# Patient Record
Sex: FEMALE | Race: WHITE | NOT HISPANIC OR LATINO | Employment: STUDENT | URBAN - METROPOLITAN AREA
[De-identification: names, ages, dates, MRNs, and addresses within clinical notes are randomized per-mention and may not be internally consistent; named-entity substitution may affect disease eponyms.]

---

## 2023-07-12 ENCOUNTER — APPOINTMENT (OUTPATIENT)
Dept: LAB | Facility: CLINIC | Age: 23
End: 2023-07-12

## 2023-07-12 DIAGNOSIS — Z02.1 PRE-EMPLOYMENT HEALTH SCREENING EXAMINATION: ICD-10-CM

## 2023-07-12 LAB
MEV IGG SER QL IA: NORMAL
MUV IGG SER QL IA: NORMAL
RUBV IGG SERPL IA-ACNC: 26.8 IU/ML
VZV IGG SER QL IA: NORMAL

## 2023-07-12 PROCEDURE — 36415 COLL VENOUS BLD VENIPUNCTURE: CPT

## 2023-07-12 PROCEDURE — 86765 RUBEOLA ANTIBODY: CPT

## 2023-07-12 PROCEDURE — 86787 VARICELLA-ZOSTER ANTIBODY: CPT

## 2023-07-12 PROCEDURE — 86735 MUMPS ANTIBODY: CPT

## 2023-07-12 PROCEDURE — 86762 RUBELLA ANTIBODY: CPT

## 2023-07-12 PROCEDURE — 86480 TB TEST CELL IMMUN MEASURE: CPT

## 2023-07-13 LAB
GAMMA INTERFERON BACKGROUND BLD IA-ACNC: 0.04 IU/ML
M TB IFN-G BLD-IMP: NEGATIVE
M TB IFN-G CD4+ BCKGRND COR BLD-ACNC: 0.02 IU/ML
M TB IFN-G CD4+ BCKGRND COR BLD-ACNC: 0.02 IU/ML
MITOGEN IGNF BCKGRD COR BLD-ACNC: >10 IU/ML

## 2024-08-26 ENCOUNTER — OFFICE VISIT (OUTPATIENT)
Age: 24
End: 2024-08-26

## 2024-08-26 VITALS — TEMPERATURE: 98.4 F | WEIGHT: 293 LBS

## 2024-08-26 DIAGNOSIS — B07.9 VERRUCA VULGARIS: Primary | ICD-10-CM

## 2024-08-26 DIAGNOSIS — D22.39 FIBROUS PAPULE OF NOSE: ICD-10-CM

## 2024-08-26 NOTE — PATIENT INSTRUCTIONS
"VERRUCA VULGARIS (\"Common Warts\")    Physical Exam:  Anatomic Location Affected:  right and left hands, left elbow  Morphological Description:  verruca vulgaris  Pertinent Positives:  Pertinent Negatives:    Additional History of Present Condition:  previous cryo treatment    Assessment and Plan:  Based on a thorough discussion of this condition and the management approach to it (including a comprehensive discussion of the known risks, side effects and potential benefits of treatment), the patient (family) agrees to implement the following specific plan:  Apply over the counter Compound W wart treatment; paint on; file warts  Liquid nitrogen was applied for 10-12 seconds to the skin lesion and the expected blistering or scabbing reaction explained. Do not pick at the area. Patient reminded to expect hypopigmented scars from the procedure. Return if lesion fails to fully resolve.      Verruca Vulgaris  A verruca is a common growth of the skin caused by infection by human papilloma virus (HPV). There are many strains of the virus that cause different types of warts on the body. The virus infects the most superficial layers of the skin, causing increased production of skin cells and thickening. Warts can be spread through direct contact with infected skin and may spread to other parts of the body if scratched or picked.     A verruca is more commonly called a \"wart.\" Warts are particularly common in school-aged children but can arise at any age. Patients who have a history of eczema are especially prone due to impaired skin barrier. Those taking immunosuppressive drugs or with HIV infections may experience prolonged symptoms despite treatment.     Warts generally have a rough surface with a tiny black dot sometimes observed in the middle of each scaly spot. They can range in size from a small bump to large scaly growths.  Common warts are often found on the backs of fingers or toes, around the nails, and on the knees. " Plantar warts can grow inwardly on the soles of the feet causing pain.    There are many possible ways to treat warts and sometimes several different treatments are needed to get the warts to go away completely. There is no single perfect treatment for warts, and successful treatment can take many months.     In-office treatments usually require multiple visits, and include:  Cryotherapy. a cold spray with liquid nitrogen will destroy the infected cells but may lead to discomfort and blistering. It may also leave a permanent white stan or scar.      Electrosurgery (curettage and cautery) can be used for large resistant warts which involves shaving the growth down and burning the base. It is performed under local anesthesia and may leave a permanent scar    Candida (“yeast”) antigen injections. These are extracts of the common yeast (Candida) that cannot cause an infection. The medication is injected into/under the wart. It is thought to stimulate the immune system to recognize the wart virus and attack it. Multiple injections are often needed about one month apart.    There are also several at-home wart treatments:    Soak the warts in warm water for 5 minutes every night followed by gentle filing with a nail file or pumice stone.    Topical salicylic acid or similar compounds work by removing the dead surface skin cells  Apply the medicine directly to the wart, wait for it to dry completely, then cover with duct tape overnight   Repeat until the wart is gone, which can take 2-4 months  Do not use on the face or groin area   If the wart paint makes the skin sore, stop treatment until the discomfort has settled, then recommence as above.  Take care to keep the chemical off normal skin.    Podophyllin is a cytotoxic agent used in some products and must not be used in pregnancy or women considering pregnancy.    Some prescription medications include   Topical retinoids (adapalene, tretinoin, tazarotene), 5-fluorouracil  "(Efudex) or imiquimod (Aldara) creams are sometimes used to treat flat warts or warts on the face and other sensitive anatomical areas. They are usually applied directly to the warts once a day for 2-4 months and can be irritating.  These treatments should only be used as directed by your health care provider.  Systemic treatment with oral cimetidine (Tagamet) may help boost the immune system against the wart virus in patients, some of the time.  Initiation of cimetidine therapy should ONLY be done under the supervision of your health care provider, who can discuss possible side effects and drug-to-drug interactions of this specific treatment.        FIBROUS PAPULE (\"ANGIOFIBROMA\")    Physical Exam:  Anatomic Location Affected:  left side tip of nose  Morphological Description:  yellowish papule  Pertinent Positives:  Pertinent Negatives:    Additional History of Present Condition:  patient notes that has papule on nose; won't go away    Assessment and Plan:  Based on a thorough discussion of this condition and the management approach to it (including a comprehensive discussion of the known risks, side effects and potential benefits of treatment), the patient (family) agrees to implement the following specific plan:  Reassured benign    A fibrous papule is a firm bump that most often occurs on the nose. It is very common and has a characteristic appearance under the microscope.    A fibrous papule develops during late adolescence or early adult life on the nose, or less often, elsewhere on the face. It is a dome shaped shiny bump measuring 2-6 mm in diameter, sometimes with a central hair. Although it looks similar to a skin-colored mole (dermal nevus), it is firmer in texture. It is harmless but will not go away on its own.   It is important to distinguish fibrous papule from basal cell carcinoma, which may also present as a firm shiny bump. Basal cell carcinoma most often arises later in life. It grows slowly and " tends to bleed and ulcerate.     A fibrous papule is diagnosed either clinically or by skin biopsy. There are distinctive features  on pathology (proliferation of fibroblasts, fibrotic stroma, and dilated blood vessels).      Fibrous papule is considered a nevus, and develops spontaneously. The precise reason is unknown.  Fibrous papules do not require any treatment. If desired it may be removed by a minor surgical procedure (excision biopsy, shave biopsy or electrosurgery).

## 2024-08-26 NOTE — PROGRESS NOTES
"[facility] Dermatology Clinic Note     Patient Name: Yelena Pierce  Encounter Date: 8/26/24     Have you been cared for by a [facility] Dermatologist in the last 3 years and, if so, which description applies to you?    NO.   I am considered a \"new\" patient and must complete all patient intake questions. I am FEMALE/of child-bearing potential.    REVIEW OF SYSTEMS:  Have you recently had or currently have any of the following? Recent fever or chills? No  Any non-healing wound? No  Are you pregnant or planning to become pregnant? No  Are you currently or planning to be nursing or breast feeding? No   PAST MEDICAL HISTORY:  Have you personally ever had or currently have any of the following?  If \"YES,\" then please provide more detail. Skin cancer (such as Melanoma, Basal Cell Carcinoma, Squamous Cell Carcinoma?  No  Tuberculosis, HIV/AIDS, Hepatitis B or C: No  Radiation Treatment No   HISTORY OF IMMUNOSUPPRESSION:   Do you have a history of any of the following:  Systemic Immunosuppression such as Diabetes, Biologic or Immunotherapy, Chemotherapy, Organ Transplantation, Bone Marrow Transplantation or Prednsione?  No    Answering \"YES\" requires the addition of the dotphrase \"IMMUNOSUPPRESSED\" as the first diagnosis of the patient's visit.   FAMILY HISTORY:  Any \"first degree relatives\" (parent, brother, sister, or child) with the following?    Skin Cancer, Pancreatic or Other Cancer? No   PATIENT EXPERIENCE:    Do you want the Dermatologist to perform a COMPLETE skin exam today including a clinical examination under the \"bra and underwear\" areas?  NO  If necessary, do we have your permission to call and leave a detailed message on your Preferred Phone number that includes your specific medical information?  Yes      No Known Allergies No current outpatient medications on file.          Whom besides the patient is providing clinical information about today's encounter?   NO ADDITIONAL HISTORIAN (patient alone provided " "history)    Physical Exam and Assessment/Plan by Diagnosis:      VERRUCA VULGARIS (\"Common Warts\")    Physical Exam:  Anatomic Location Affected:  right and left hands, left elbow.  Several periungual warts both proximal and distal nail foldsl  Morphological Description:  verruca vulgaris  Pertinent Positives:  Pertinent Negatives:    Additional History of Present Condition:  previous cryo treatment    Assessment and Plan:  Based on a thorough discussion of this condition and the management approach to it (including a comprehensive discussion of the known risks, side effects and potential benefits of treatment), the patient (family) agrees to implement the following specific plan:  Apply over the counter Compound W wart treatment; paint on; file warts  Liquid nitrogen was applied for 10-12 seconds to the skin lesion and the expected blistering or scabbing reaction explained. Do not pick at the area. Patient reminded to expect hypopigmented scars from the procedure. Return if lesion fails to fully resolve.  Follow up in 1 month  I discussed that these are difficult areas to treat.   Option of immuno/ marcela therapy tentatively discussed.      Verruca Vulgaris  A verruca is a common growth of the skin caused by infection by human papilloma virus (HPV). There are many strains of the virus that cause different types of warts on the body. The virus infects the most superficial layers of the skin, causing increased production of skin cells and thickening. Warts can be spread through direct contact with infected skin and may spread to other parts of the body if scratched or picked.     A verruca is more commonly called a \"wart.\" Warts are particularly common in school-aged children but can arise at any age. Patients who have a history of eczema are especially prone due to impaired skin barrier. Those taking immunosuppressive drugs or with HIV infections may experience prolonged symptoms despite treatment.     Warts " generally have a rough surface with a tiny black dot sometimes observed in the middle of each scaly spot. They can range in size from a small bump to large scaly growths.  Common warts are often found on the backs of fingers or toes, around the nails, and on the knees. Plantar warts can grow inwardly on the soles of the feet causing pain.    There are many possible ways to treat warts and sometimes several different treatments are needed to get the warts to go away completely. There is no single perfect treatment for warts, and successful treatment can take many months.     In-office treatments usually require multiple visits, and include:  Cryotherapy. a cold spray with liquid nitrogen will destroy the infected cells but may lead to discomfort and blistering. It may also leave a permanent white stan or scar.      Electrosurgery (curettage and cautery) can be used for large resistant warts which involves shaving the growth down and burning the base. It is performed under local anesthesia and may leave a permanent scar    Candida (“yeast”) antigen injections. These are extracts of the common yeast (Candida) that cannot cause an infection. The medication is injected into/under the wart. It is thought to stimulate the immune system to recognize the wart virus and attack it. Multiple injections are often needed about one month apart.    There are also several at-home wart treatments:    Soak the warts in warm water for 5 minutes every night followed by gentle filing with a nail file or pumice stone.    Topical salicylic acid or similar compounds work by removing the dead surface skin cells  Apply the medicine directly to the wart, wait for it to dry completely, then cover with duct tape overnight   Repeat until the wart is gone, which can take 2-4 months  Do not use on the face or groin area   If the wart paint makes the skin sore, stop treatment until the discomfort has settled, then recommence as above.  Take care to  "keep the chemical off normal skin.    Podophyllin is a cytotoxic agent used in some products and must not be used in pregnancy or women considering pregnancy.    Some prescription medications include   Topical retinoids (adapalene, tretinoin, tazarotene), 5-fluorouracil (Efudex) or imiquimod (Aldara) creams are sometimes used to treat flat warts or warts on the face and other sensitive anatomical areas. They are usually applied directly to the warts once a day for 2-4 months and can be irritating.  These treatments should only be used as directed by your health care provider.  Systemic treatment with oral cimetidine (Tagamet) may help boost the immune system against the wart virus in patients, some of the time.  Initiation of cimetidine therapy should ONLY be done under the supervision of your health care provider, who can discuss possible side effects and drug-to-drug interactions of this specific treatment.        FIBROUS PAPULE (\"ANGIOFIBROMA\")    Physical Exam:  Anatomic Location Affected:  left side tip of nose  Morphological Description:  yellowish papule  Pertinent Positives:  Pertinent Negatives:    Additional History of Present Condition:  patient notes that has papule on nose; won't go away    Assessment and Plan:  Based on a thorough discussion of this condition and the management approach to it (including a comprehensive discussion of the known risks, side effects and potential benefits of treatment), the patient (family) agrees to implement the following specific plan:  Reassured benign    A fibrous papule is a firm bump that most often occurs on the nose. It is very common and has a characteristic appearance under the microscope.    A fibrous papule develops during late adolescence or early adult life on the nose, or less often, elsewhere on the face. It is a dome shaped shiny bump measuring 2-6 mm in diameter, sometimes with a central hair. Although it looks similar to a skin-colored mole (dermal " nevus), it is firmer in texture. It is harmless but will not go away on its own.   It is important to distinguish fibrous papule from basal cell carcinoma, which may also present as a firm shiny bump. Basal cell carcinoma most often arises later in life. It grows slowly and tends to bleed and ulcerate.     A fibrous papule is diagnosed either clinically or by skin biopsy. There are distinctive features  on pathology (proliferation of fibroblasts, fibrotic stroma, and dilated blood vessels).      Fibrous papule is considered a nevus, and develops spontaneously. The precise reason is unknown.  Fibrous papules do not require any treatment. If desired it may be removed by a minor surgical procedure (excision biopsy, shave biopsy or electrosurgery).     Scribe Attestation    I,:  Nat Vora am acting as a scribe while in the presence of the attending physician.:       I,:  Edin Lynn MD personally performed the services described in this documentation    as scribed in my presence.: